# Patient Record
(demographics unavailable — no encounter records)

---

## 2024-11-11 NOTE — DISCUSSION/SUMMARY
[FreeTextEntry1] : D/W parents [employer] paperwork, medical diagnosis and medications; completed paperwork as requested.  time spent in chart review, face to face, documentation, paperwork completion: 30min

## 2024-11-11 NOTE — HISTORY OF PRESENT ILLNESS
[de-identified] : Filling out paperwork. [FreeTextEntry6] : Pt here to fill out [employer] paperwork, pt is nonverbal, reviewed with parent medical diagnosis, current medications as well as supports in place; pt attends speech and OT therapy at school; pt had bath chair at home and uses diapers. She is unable to administer her own medications and requires support for activities of daily living meds: see medication list

## 2024-11-11 NOTE — HISTORY OF PRESENT ILLNESS
[de-identified] : Filling out paperwork. [FreeTextEntry6] : Pt here to fill out [employer] paperwork, pt is nonverbal, reviewed with parent medical diagnosis, current medications as well as supports in place; pt attends speech and OT therapy at school; pt had bath chair at home and uses diapers. She is unable to administer her own medications and requires support for activities of daily living meds: see medication list